# Patient Record
Sex: MALE | Race: WHITE | Employment: PART TIME | URBAN - METROPOLITAN AREA
[De-identification: names, ages, dates, MRNs, and addresses within clinical notes are randomized per-mention and may not be internally consistent; named-entity substitution may affect disease eponyms.]

---

## 2023-06-23 ENCOUNTER — OFFICE VISIT (OUTPATIENT)
Age: 18
End: 2023-06-23

## 2023-06-23 VITALS
DIASTOLIC BLOOD PRESSURE: 74 MMHG | WEIGHT: 134 LBS | BODY MASS INDEX: 24.66 KG/M2 | TEMPERATURE: 98 F | HEART RATE: 71 BPM | OXYGEN SATURATION: 98 % | HEIGHT: 62 IN | RESPIRATION RATE: 16 BRPM | SYSTOLIC BLOOD PRESSURE: 126 MMHG

## 2023-06-23 DIAGNOSIS — R42 VERTIGO: Primary | ICD-10-CM

## 2023-06-23 RX ORDER — MECLIZINE HYDROCHLORIDE 25 MG/1
25 TABLET ORAL 3 TIMES DAILY PRN
Qty: 15 TABLET | Refills: 0 | Status: SHIPPED | OUTPATIENT
Start: 2023-06-23

## 2023-06-23 ASSESSMENT — ENCOUNTER SYMPTOMS
NAUSEA: 0
ABDOMINAL PAIN: 0
VOMITING: 0

## 2023-06-23 NOTE — PROGRESS NOTES
Chief Complaint   Patient presents with    New Patient    Dizziness     Pt here today with complaint of lightheadedness since yesterday. States symptoms got worse through the day yesterday, woke up feeling better today but symptoms have returned. Denies spinning dizziness, denies chest pain or shortness of breath. Reports recently found insect bite to left thigh. Dizziness  Severity:  Mild  Onset quality:  Sudden  Duration:  1 day  Timing:  Intermittent  Progression:  Unchanged  Chronicity:  New  Context:  Feels light headed with head movement and position change  Relieved by:  Staying still  Ineffective treatments:  None  Associated symptoms: no abdominal pain, no chest pain, no congestion, no ear pain, no fatigue, no fever, no loss of consciousness, no myalgias, no nausea and no vomiting    Associated symptoms comment:  No loss of balance     History reviewed. No pertinent past medical history. History reviewed. No pertinent surgical history. Social History     Tobacco Use    Smoking status: Never    Smokeless tobacco: Never        No Known Allergies     Review of Systems   Constitutional:  Negative for fatigue and fever. HENT:  Negative for congestion and ear pain. Cardiovascular:  Negative for chest pain. Gastrointestinal:  Negative for abdominal pain, nausea and vomiting. Musculoskeletal:  Negative for myalgias. Neurological:  Positive for dizziness and light-headedness. Negative for loss of consciousness. All other systems reviewed and are negative. /74   Pulse 71   Temp 98 °F (36.7 °C)   Resp 16   Ht 5' 2\" (1.575 m)   Wt 134 lb (60.8 kg)   SpO2 98%   BMI 24.51 kg/m²      Physical Exam  Vitals and nursing note reviewed. Constitutional:       General: He is not in acute distress. Appearance: Normal appearance. HENT:      Right Ear: Tympanic membrane normal. There is no impacted cerumen. Left Ear: Tympanic membrane normal. There is no impacted cerumen.

## 2023-06-28 ENCOUNTER — APPOINTMENT (OUTPATIENT)
Facility: HOSPITAL | Age: 18
End: 2023-06-28
Payer: COMMERCIAL

## 2023-06-28 ENCOUNTER — HOSPITAL ENCOUNTER (EMERGENCY)
Facility: HOSPITAL | Age: 18
Discharge: HOME | End: 2023-06-28
Attending: STUDENT IN AN ORGANIZED HEALTH CARE EDUCATION/TRAINING PROGRAM
Payer: COMMERCIAL

## 2023-06-28 VITALS
HEIGHT: 63 IN | BODY MASS INDEX: 23.87 KG/M2 | RESPIRATION RATE: 18 BRPM | HEART RATE: 103 BPM | WEIGHT: 134.7 LBS | SYSTOLIC BLOOD PRESSURE: 128 MMHG | DIASTOLIC BLOOD PRESSURE: 82 MMHG | OXYGEN SATURATION: 99 % | TEMPERATURE: 97.3 F

## 2023-06-28 DIAGNOSIS — H53.8 BLURRY VISION, BILATERAL: Primary | ICD-10-CM

## 2023-06-28 DIAGNOSIS — R42 LIGHTHEADEDNESS: ICD-10-CM

## 2023-06-28 LAB
ALBUMIN SERPL-MCNC: 4.1 G/DL (ref 3.5–5)
ALBUMIN/GLOB SERPL: 1.1 (ref 1.1–2.2)
ALP SERPL-CCNC: 109 U/L (ref 45–117)
ALT SERPL-CCNC: 61 U/L (ref 12–78)
ANION GAP SERPL CALC-SCNC: 7 MMOL/L (ref 5–15)
AST SERPL-CCNC: 28 U/L (ref 15–37)
BASOPHILS # BLD: 0 K/UL (ref 0–0.1)
BASOPHILS NFR BLD: 1 % (ref 0–1)
BILIRUB SERPL-MCNC: 1.4 MG/DL (ref 0.2–1)
BUN SERPL-MCNC: 15 MG/DL (ref 6–20)
BUN/CREAT SERPL: 18 (ref 12–20)
CALCIUM SERPL-MCNC: 9.2 MG/DL (ref 8.5–10.1)
CHLORIDE SERPL-SCNC: 110 MMOL/L (ref 97–108)
CO2 SERPL-SCNC: 22 MMOL/L (ref 21–32)
CREAT SERPL-MCNC: 0.84 MG/DL (ref 0.7–1.3)
DIFFERENTIAL METHOD BLD: ABNORMAL
EOSINOPHIL # BLD: 0.1 K/UL (ref 0–0.4)
EOSINOPHIL NFR BLD: 2 % (ref 0–7)
ERYTHROCYTE [DISTWIDTH] IN BLOOD BY AUTOMATED COUNT: 12.8 % (ref 11.5–14.5)
GLOBULIN SER CALC-MCNC: 3.9 G/DL (ref 2–4)
GLUCOSE SERPL-MCNC: 116 MG/DL (ref 65–100)
HCT VFR BLD AUTO: 45.1 % (ref 36.6–50.3)
HGB BLD-MCNC: 15.6 G/DL (ref 12.1–17)
IMM GRANULOCYTES # BLD AUTO: 0 K/UL (ref 0–0.04)
IMM GRANULOCYTES NFR BLD AUTO: 1 % (ref 0–0.5)
LYMPHOCYTES # BLD: 1.2 K/UL (ref 0.8–3.5)
LYMPHOCYTES NFR BLD: 19 % (ref 12–49)
MAGNESIUM SERPL-MCNC: 2.3 MG/DL (ref 1.6–2.4)
MCH RBC QN AUTO: 30.2 PG (ref 26–34)
MCHC RBC AUTO-ENTMCNC: 34.6 G/DL (ref 30–36.5)
MCV RBC AUTO: 87.2 FL (ref 80–99)
MONOCYTES # BLD: 0.9 K/UL (ref 0–1)
MONOCYTES NFR BLD: 14 % (ref 5–13)
NEUTS SEG # BLD: 4.3 K/UL (ref 1.8–8)
NEUTS SEG NFR BLD: 63 % (ref 32–75)
NRBC # BLD: 0 K/UL (ref 0–0.01)
NRBC BLD-RTO: 0 PER 100 WBC
PLATELET # BLD AUTO: 225 K/UL (ref 150–400)
PMV BLD AUTO: 9.5 FL (ref 8.9–12.9)
POTASSIUM SERPL-SCNC: 3.2 MMOL/L (ref 3.5–5.1)
PROT SERPL-MCNC: 8 G/DL (ref 6.4–8.2)
RBC # BLD AUTO: 5.17 M/UL (ref 4.1–5.7)
SODIUM SERPL-SCNC: 139 MMOL/L (ref 136–145)
WBC # BLD AUTO: 6.6 K/UL (ref 4.1–11.1)

## 2023-06-28 PROCEDURE — 83735 ASSAY OF MAGNESIUM: CPT

## 2023-06-28 PROCEDURE — 80053 COMPREHEN METABOLIC PANEL: CPT

## 2023-06-28 PROCEDURE — 93005 ELECTROCARDIOGRAM TRACING: CPT | Performed by: STUDENT IN AN ORGANIZED HEALTH CARE EDUCATION/TRAINING PROGRAM

## 2023-06-28 PROCEDURE — 85025 COMPLETE CBC W/AUTO DIFF WBC: CPT

## 2023-06-28 PROCEDURE — 6370000000 HC RX 637 (ALT 250 FOR IP): Performed by: STUDENT IN AN ORGANIZED HEALTH CARE EDUCATION/TRAINING PROGRAM

## 2023-06-28 PROCEDURE — 99284 EMERGENCY DEPT VISIT MOD MDM: CPT

## 2023-06-28 PROCEDURE — 36415 COLL VENOUS BLD VENIPUNCTURE: CPT

## 2023-06-28 PROCEDURE — 70450 CT HEAD/BRAIN W/O DYE: CPT

## 2023-06-28 RX ORDER — BUTALBITAL/ACETAMINOPHEN 50MG-325MG
1 TABLET ORAL EVERY 4 HOURS PRN
Qty: 120 TABLET | Refills: 0 | Status: SHIPPED | OUTPATIENT
Start: 2023-06-28

## 2023-06-28 RX ORDER — BUTALBITAL, ACETAMINOPHEN AND CAFFEINE 50; 325; 40 MG/1; MG/1; MG/1
1 TABLET ORAL
Status: COMPLETED | OUTPATIENT
Start: 2023-06-28 | End: 2023-06-28

## 2023-06-28 RX ORDER — POTASSIUM CHLORIDE 20 MEQ/1
20 TABLET, EXTENDED RELEASE ORAL ONCE
Status: DISCONTINUED | OUTPATIENT
Start: 2023-06-28 | End: 2023-06-28 | Stop reason: HOSPADM

## 2023-06-28 RX ADMIN — BUTALBITAL, ACETAMINOPHEN, AND CAFFEINE 1 TABLET: 50; 325; 40 TABLET ORAL at 14:10

## 2023-06-28 ASSESSMENT — PAIN SCALES - GENERAL
PAINLEVEL_OUTOF10: 1
PAINLEVEL_OUTOF10: 0

## 2023-06-29 LAB
EKG ATRIAL RATE: 90 BPM
EKG DIAGNOSIS: NORMAL
EKG P AXIS: 84 DEGREES
EKG P-R INTERVAL: 138 MS
EKG Q-T INTERVAL: 348 MS
EKG QRS DURATION: 94 MS
EKG QTC CALCULATION (BAZETT): 425 MS
EKG R AXIS: 86 DEGREES
EKG T AXIS: 44 DEGREES
EKG VENTRICULAR RATE: 90 BPM

## 2023-10-30 ENCOUNTER — OFFICE VISIT (OUTPATIENT)
Age: 18
End: 2023-10-30
Payer: COMMERCIAL

## 2023-10-30 VITALS
OXYGEN SATURATION: 98 % | HEART RATE: 88 BPM | DIASTOLIC BLOOD PRESSURE: 71 MMHG | SYSTOLIC BLOOD PRESSURE: 125 MMHG | BODY MASS INDEX: 23.92 KG/M2 | RESPIRATION RATE: 18 BRPM | WEIGHT: 135 LBS | HEIGHT: 63 IN

## 2023-10-30 DIAGNOSIS — G43.009 MIGRAINE WITHOUT AURA AND WITHOUT STATUS MIGRAINOSUS, NOT INTRACTABLE: ICD-10-CM

## 2023-10-30 DIAGNOSIS — R42 DIZZINESS: Primary | ICD-10-CM

## 2023-10-30 DIAGNOSIS — H53.9 VISUAL CHANGES: ICD-10-CM

## 2023-10-30 PROCEDURE — 99205 OFFICE O/P NEW HI 60 MIN: CPT | Performed by: NURSE PRACTITIONER

## 2023-10-30 RX ORDER — RIZATRIPTAN BENZOATE 10 MG/1
TABLET, ORALLY DISINTEGRATING ORAL
Qty: 9 TABLET | Refills: 4 | Status: SHIPPED | OUTPATIENT
Start: 2023-10-30

## 2023-10-30 RX ORDER — AMITRIPTYLINE HYDROCHLORIDE 10 MG/1
10 TABLET, FILM COATED ORAL NIGHTLY
Qty: 90 TABLET | Refills: 1 | Status: SHIPPED | OUTPATIENT
Start: 2023-10-30

## 2023-10-30 ASSESSMENT — PATIENT HEALTH QUESTIONNAIRE - PHQ9
2. FEELING DOWN, DEPRESSED OR HOPELESS: 0
SUM OF ALL RESPONSES TO PHQ9 QUESTIONS 1 & 2: 0
1. LITTLE INTEREST OR PLEASURE IN DOING THINGS: 0
SUM OF ALL RESPONSES TO PHQ QUESTIONS 1-9: 0

## 2023-10-31 NOTE — PROGRESS NOTES
Tom Hopkins is a 19 y.o. male who presents with the following  Chief Complaint   Patient presents with    New Patient     Patient is here for headaches and blurry vision. It's been going on for about 4-5 months. Patient saw Dr. Lamont Juarez for the vision,he thought it was a migraine       HPI      New patient comes in with headaches and blurry vision with his father  They have been going on for about 4 to 5 weeks  They saw an eye doctor which was told them that it was ocular migraines  He cannot really describe how he feels formally but states sometimes he gets headaches and sometimes is just visual disturbances    He did have a CT which was normal  No MRI of the brain now    He states that his vision will come out of go out and be odd looking  May be like flashes or kaleidoscope  This happens a couple times a month but never escalates into a full-blown headache  He does get other types of headaches which are unilateral and debilitating  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 Max 2 in 24 hours 9 tablet 4    acetaminophen-butalbital  MG TABS Take 1 tablet by mouth every 4 hours as needed for Headaches 120 tablet 0    meclizine (ANTIVERT) 25 MG tablet Take 1 tablet by mouth 3 times daily as needed for Dizziness 15 tablet 0     No current facility-administered medications for this visit.         Social History     Tobacco Use   Smoking Status Never   Smokeless Tobacco Never

## 2023-11-19 ENCOUNTER — HOSPITAL ENCOUNTER (OUTPATIENT)
Facility: HOSPITAL | Age: 18
Discharge: HOME | End: 2023-11-22
Payer: COMMERCIAL

## 2023-11-19 DIAGNOSIS — H53.9 VISUAL CHANGES: ICD-10-CM

## 2023-11-19 DIAGNOSIS — G43.009 MIGRAINE WITHOUT AURA AND WITHOUT STATUS MIGRAINOSUS, NOT INTRACTABLE: ICD-10-CM

## 2023-11-19 DIAGNOSIS — R42 DIZZINESS: ICD-10-CM

## 2023-11-19 PROCEDURE — 6360000004 HC RX CONTRAST MEDICATION: Performed by: NURSE PRACTITIONER

## 2023-11-19 PROCEDURE — 70553 MRI BRAIN STEM W/O & W/DYE: CPT

## 2023-11-19 PROCEDURE — A9579 GAD-BASE MR CONTRAST NOS,1ML: HCPCS | Performed by: NURSE PRACTITIONER

## 2023-11-19 RX ADMIN — GADOTERIDOL 12 ML: 279.3 INJECTION, SOLUTION INTRAVENOUS at 11:38

## 2023-12-14 ENCOUNTER — OFFICE VISIT (OUTPATIENT)
Age: 18
End: 2023-12-14

## 2023-12-14 VITALS
WEIGHT: 141 LBS | DIASTOLIC BLOOD PRESSURE: 76 MMHG | TEMPERATURE: 97.9 F | OXYGEN SATURATION: 97 % | SYSTOLIC BLOOD PRESSURE: 121 MMHG | BODY MASS INDEX: 24.98 KG/M2 | RESPIRATION RATE: 20 BRPM | HEART RATE: 66 BPM

## 2023-12-14 DIAGNOSIS — J02.9 PHARYNGITIS, UNSPECIFIED ETIOLOGY: Primary | ICD-10-CM

## 2023-12-14 DIAGNOSIS — R11.0 NAUSEA: ICD-10-CM

## 2023-12-14 LAB
Lab: NORMAL
QC PASS/FAIL: NORMAL
SARS-COV-2 RDRP RESP QL NAA+PROBE: NEGATIVE
STREP PYOGENES DNA, POC: NEGATIVE
VALID INTERNAL CONTROL, POC: NORMAL

## 2023-12-14 RX ORDER — AMOXICILLIN 500 MG/1
500 CAPSULE ORAL 3 TIMES DAILY
Qty: 21 CAPSULE | Refills: 0 | Status: SHIPPED | OUTPATIENT
Start: 2023-12-14 | End: 2023-12-21

## 2023-12-14 RX ORDER — ONDANSETRON 4 MG/1
4 TABLET, ORALLY DISINTEGRATING ORAL 3 TIMES DAILY PRN
Qty: 12 TABLET | Refills: 0 | Status: SHIPPED | OUTPATIENT
Start: 2023-12-14

## 2023-12-15 NOTE — PATIENT INSTRUCTIONS
Dayquil/Nyquil as directed  Light diet- use maaloz for pain   Zyrtec/ Allegra daily   Saline sinus rinse

## 2024-01-15 ENCOUNTER — OFFICE VISIT (OUTPATIENT)
Age: 19
End: 2024-01-15

## 2024-01-15 VITALS
RESPIRATION RATE: 18 BRPM | TEMPERATURE: 98.9 F | DIASTOLIC BLOOD PRESSURE: 73 MMHG | HEART RATE: 67 BPM | BODY MASS INDEX: 25.33 KG/M2 | OXYGEN SATURATION: 98 % | WEIGHT: 143 LBS | SYSTOLIC BLOOD PRESSURE: 122 MMHG

## 2024-01-15 DIAGNOSIS — G43.009 MIGRAINE WITHOUT AURA AND WITHOUT STATUS MIGRAINOSUS, NOT INTRACTABLE: Primary | ICD-10-CM

## 2024-01-15 DIAGNOSIS — H53.9 VISUAL CHANGES: ICD-10-CM

## 2024-01-15 NOTE — PROGRESS NOTES
Colin Elias (:  2005) is a 18 y.o. male,Established patient, here for evaluation of the following chief complaint(s):  Headache (C/o headache. Sx for 1 year.)      ASSESSMENT/PLAN:  Visit Diagnoses and Associated Orders       Migraine without aura and without status migrainosus, not intractable    -  Primary         Visual changes                      Recommend headache diary.  May try tylenol, ibuprofen or excedrin migraine prn visual disturbance or headache.      Follow up with Neuro in PRN days if symptoms persist or ED if symptoms worsen.    SUBJECTIVE/OBJECTIVE:    Headache       18 y.o. male presents with mother with symptoms of headaches and visual disturbances for several months.  He reports visual disturbances 2-3 times a month.  States he was diagnosed with ocular migraine by ER physician and eye doctor Summer '23.  Today he was incapacitated by visual disturbance and had to leave work.  States vision goes in and out/blurry.  Vision improved after going home and resting.  He denies any visual disturbance or headache at this time.    Visual disturbances are sometimes accompanied by headaches but this is rare.  Mostly is having visual disturbances rather than headaches.  Had normal head CT in 2023 and MRI in  which was also normal.  Saw Neurology in late October and will follow up in April.  Taking amitriptyline nightly.  He has tried fioricet in past without relief.  Rizatriptan does seem to help.           Vitals:    01/15/24 1758   BP: 122/73   Site: Right Upper Arm   Position: Sitting   Cuff Size: Medium Adult   Pulse: 67   Resp: 18   Temp: 98.9 °F (37.2 °C)   TempSrc: Temporal   SpO2: 98%   Weight: 64.9 kg (143 lb)       No results found for this visit on 01/15/24.     Physical Exam  Constitutional:       General: He is not in acute distress.     Appearance: Normal appearance. He is not ill-appearing or toxic-appearing.   HENT:      Head: Normocephalic and atraumatic.

## 2024-01-25 ENCOUNTER — TELEPHONE (OUTPATIENT)
Age: 19
End: 2024-01-25

## 2024-01-25 NOTE — TELEPHONE ENCOUNTER
Patients mother called to say he has been experiencing migraines and he is asking for a medication to be prescribed. She states he has been having two a week. Thank you.

## 2024-04-04 ENCOUNTER — OFFICE VISIT (OUTPATIENT)
Age: 19
End: 2024-04-04
Payer: COMMERCIAL

## 2024-04-04 VITALS
DIASTOLIC BLOOD PRESSURE: 68 MMHG | OXYGEN SATURATION: 99 % | BODY MASS INDEX: 24.63 KG/M2 | WEIGHT: 139 LBS | HEART RATE: 82 BPM | SYSTOLIC BLOOD PRESSURE: 120 MMHG | RESPIRATION RATE: 15 BRPM | TEMPERATURE: 98.4 F | HEIGHT: 63 IN

## 2024-04-04 DIAGNOSIS — G43.101 MIGRAINE WITH AURA AND WITH STATUS MIGRAINOSUS, NOT INTRACTABLE: ICD-10-CM

## 2024-04-04 DIAGNOSIS — J32.4 CHRONIC PANSINUSITIS: ICD-10-CM

## 2024-04-04 DIAGNOSIS — G43.109 MIGRAINE AURA OCCURRING WITH AND WITHOUT HEADACHE: Primary | ICD-10-CM

## 2024-04-04 PROCEDURE — 99214 OFFICE O/P EST MOD 30 MIN: CPT | Performed by: PSYCHIATRY & NEUROLOGY

## 2024-04-04 RX ORDER — ASPIRIN 81 MG/1
81 TABLET ORAL DAILY
COMMUNITY

## 2024-04-04 ASSESSMENT — PATIENT HEALTH QUESTIONNAIRE - PHQ9
SUM OF ALL RESPONSES TO PHQ QUESTIONS 1-9: 0
1. LITTLE INTEREST OR PLEASURE IN DOING THINGS: NOT AT ALL
SUM OF ALL RESPONSES TO PHQ9 QUESTIONS 1 & 2: 0
SUM OF ALL RESPONSES TO PHQ QUESTIONS 1-9: 0
2. FEELING DOWN, DEPRESSED OR HOPELESS: NOT AT ALL

## 2024-04-04 NOTE — PROGRESS NOTES
Consult  REFERRED BY:  None, None    CHIEF COMPLAINT: Migraine auras      Subjective:     Colin Elias is a 18 y.o. right-handed  male we are seeing as a new patient, for evaluation of persistent ocular migraines and migraine auras at the request of RULA Oneill which have been going on for about a year, brought on by stress and tension at times, and sometimes bright lights, he will get some blurred vision and difficulty focusing that lasts a short time, but half an hour to an hour, and occasionally gets a headache for which she takes over-the-counter herbal medication which seems to help the headache so we will just continue doing that.  He was on Maxalt but does not take that anymore.  He takes 10 mg of amitriptyline at nighttime as preventative agents that seem to help some, and they have all of the reduced in number and severity.  He has flashing lights in his eyes sometimes with the blurred vision.  He has seen ophthalmology and they diagnosed him as ocular migraine.  He has had an MRI scan of the brain done was probably normal of the brain does show chronic pansinusitis, moderate severity, who recommended that he see an ENT physician, and so he will go see his mother's ENT physician for evaluation.  We discussed his condition with the patient and his father in detail.  Had a normal CT of the brain also in the past his MRI scan was just done in November 2023.  That was reviewed in detail with the patient and his father on the PACS system in the office today.  His examination is relatively normal though he is a little bit slow in his responses.  He does have Fioricet to use as needed for the headaches if he needs it.  He has no other neurologic complaints and seen be doing well now and says the medications of amitriptyline at nighttime has helped.    History reviewed. No pertinent past medical history.   History reviewed. No pertinent surgical history.  History reviewed. No pertinent family

## 2024-11-16 ENCOUNTER — HOSPITAL ENCOUNTER (EMERGENCY)
Facility: HOSPITAL | Age: 19
Discharge: HOME | End: 2024-11-16
Attending: EMERGENCY MEDICINE
Payer: COMMERCIAL

## 2024-11-16 VITALS
TEMPERATURE: 98.6 F | SYSTOLIC BLOOD PRESSURE: 111 MMHG | DIASTOLIC BLOOD PRESSURE: 72 MMHG | WEIGHT: 145.5 LBS | OXYGEN SATURATION: 97 % | HEART RATE: 121 BPM | BODY MASS INDEX: 25.77 KG/M2 | RESPIRATION RATE: 22 BRPM

## 2024-11-16 DIAGNOSIS — J02.9 VIRAL PHARYNGITIS: Primary | ICD-10-CM

## 2024-11-16 LAB
FLUAV RNA SPEC QL NAA+PROBE: NOT DETECTED
FLUBV RNA SPEC QL NAA+PROBE: NOT DETECTED
S PYO DNA THROAT QL NAA+PROBE: NOT DETECTED
SARS-COV-2 RNA RESP QL NAA+PROBE: NOT DETECTED
SOURCE: NORMAL

## 2024-11-16 PROCEDURE — 6370000000 HC RX 637 (ALT 250 FOR IP): Performed by: EMERGENCY MEDICINE

## 2024-11-16 PROCEDURE — 87636 SARSCOV2 & INF A&B AMP PRB: CPT

## 2024-11-16 PROCEDURE — 87651 STREP A DNA AMP PROBE: CPT

## 2024-11-16 PROCEDURE — 99283 EMERGENCY DEPT VISIT LOW MDM: CPT

## 2024-11-16 RX ORDER — ONDANSETRON 4 MG/1
8 TABLET, ORALLY DISINTEGRATING ORAL ONCE
Status: COMPLETED | OUTPATIENT
Start: 2024-11-16 | End: 2024-11-16

## 2024-11-16 RX ORDER — ONDANSETRON 8 MG/1
8 TABLET, ORALLY DISINTEGRATING ORAL EVERY 8 HOURS PRN
Qty: 15 TABLET | Refills: 0 | Status: SHIPPED | OUTPATIENT
Start: 2024-11-16

## 2024-11-16 RX ORDER — BUTALBITAL, ACETAMINOPHEN AND CAFFEINE 50; 325; 40 MG/1; MG/1; MG/1
1 TABLET ORAL
Status: COMPLETED | OUTPATIENT
Start: 2024-11-16 | End: 2024-11-16

## 2024-11-16 RX ORDER — IBUPROFEN 600 MG/1
600 TABLET, FILM COATED ORAL ONCE
Status: COMPLETED | OUTPATIENT
Start: 2024-11-16 | End: 2024-11-16

## 2024-11-16 RX ORDER — IBUPROFEN 600 MG/1
600 TABLET, FILM COATED ORAL 3 TIMES DAILY PRN
Qty: 28 TABLET | Refills: 0 | Status: SHIPPED | OUTPATIENT
Start: 2024-11-16

## 2024-11-16 RX ADMIN — BUTALBITAL, ACETAMINOPHEN, AND CAFFEINE 1 TABLET: 50; 325; 40 TABLET ORAL at 22:43

## 2024-11-16 RX ADMIN — ONDANSETRON 8 MG: 4 TABLET, ORALLY DISINTEGRATING ORAL at 22:42

## 2024-11-16 RX ADMIN — IBUPROFEN 600 MG: 600 TABLET, FILM COATED ORAL at 22:42

## 2024-11-16 ASSESSMENT — PAIN - FUNCTIONAL ASSESSMENT: PAIN_FUNCTIONAL_ASSESSMENT: NONE - DENIES PAIN

## 2024-11-16 ASSESSMENT — PAIN SCALES - GENERAL
PAINLEVEL_OUTOF10: 2
PAINLEVEL_OUTOF10: 2

## 2024-11-16 ASSESSMENT — LIFESTYLE VARIABLES
HOW OFTEN DO YOU HAVE A DRINK CONTAINING ALCOHOL: 2-4 TIMES A MONTH
HOW MANY STANDARD DRINKS CONTAINING ALCOHOL DO YOU HAVE ON A TYPICAL DAY: 1 OR 2

## 2024-11-16 ASSESSMENT — PAIN DESCRIPTION - LOCATION: LOCATION: THROAT

## 2024-11-17 NOTE — ED NOTES
Bedside and Verbal shift change report given to RAMESH Solis (oncoming nurse) by RAMESH Macdonald (offgoing nurse). Report included the following information Nurse Handoff Report, ED Encounter Summary, and ED SBAR.

## 2024-11-17 NOTE — ED PROVIDER NOTES
[facility] EMERGENCY DEPT  EMERGENCY DEPARTMENT ENCOUNTER       Pt Name: Colin Elias  MRN: [id number]  Birthdate 2005  Date of evaluation: 11/16/2024  Provider: Subhash Palma DO   PCP: None, None  Note Started: 10:30 PM EST 11/16/24     CHIEF COMPLAINT       Chief Complaint   Patient presents with    Pharyngitis     Pt ambulatory to triage c/o of sore throat since this morning, is now nauseous. Denies OTC meds PTA.         HISTORY OF PRESENT ILLNESS: 1 or more elements      History From: Patient, History limited by: none     Colin Elias is a 19 y.o. male past medical history significant for migraine presenting the emergency department complaining of sore throat, nausea, vomiting.  Symptoms started today       Please See MDM for Additional Details of the HPI/PMH  Nursing Notes were all reviewed and agreed with or any disagreements were addressed in the HPI.     REVIEW OF SYSTEMS        Positives and Pertinent negatives as per HPI.    PAST HISTORY     Past Medical History:  No past medical history on file.    Past Surgical History:  No past surgical history on file.    Family History:  No family history on file.    Social History:  Social History     Tobacco Use    Smoking status: Never     Passive exposure: Never    Smokeless tobacco: Never   Substance Use Topics    Alcohol use: Not Currently    Drug use: Never       Allergies:  Allergies   Allergen Reactions    Seasonal        CURRENT MEDICATIONS      Discharge Medication List as of 11/16/2024 11:44 PM        CONTINUE these medications which have NOT CHANGED    Details   !! NONFORMULARY Migrastil Migraine StickHistorical Med      !! NONFORMULARY [place] Homeopathic Migraine ReliefHistorical Med      aspirin 81 MG EC tablet Take 1 tablet by mouth dailyHistorical Med      !! ondansetron (ZOFRAN-ODT) 4 MG disintegrating tablet Take 1 tablet by mouth 3 times daily as needed for Nausea or Vomiting, Disp-12 tablet, R-0Normal      amitriptyline (ELAVIL) 10 MG tablet

## 2025-06-05 ENCOUNTER — APPOINTMENT (OUTPATIENT)
Facility: HOSPITAL | Age: 20
End: 2025-06-05
Payer: COMMERCIAL

## 2025-06-05 ENCOUNTER — HOSPITAL ENCOUNTER (EMERGENCY)
Facility: HOSPITAL | Age: 20
Discharge: HOME | End: 2025-06-05
Attending: EMERGENCY MEDICINE
Payer: COMMERCIAL

## 2025-06-05 VITALS
WEIGHT: 140.65 LBS | HEART RATE: 55 BPM | TEMPERATURE: 98.2 F | BODY MASS INDEX: 24.92 KG/M2 | HEIGHT: 63 IN | DIASTOLIC BLOOD PRESSURE: 65 MMHG | SYSTOLIC BLOOD PRESSURE: 136 MMHG | OXYGEN SATURATION: 97 % | RESPIRATION RATE: 20 BRPM

## 2025-06-05 DIAGNOSIS — R07.89 CHEST WALL PAIN: Primary | ICD-10-CM

## 2025-06-05 DIAGNOSIS — R06.00 DYSPNEA, UNSPECIFIED TYPE: ICD-10-CM

## 2025-06-05 LAB
EKG ATRIAL RATE: 60 BPM
EKG DIAGNOSIS: NORMAL
EKG P AXIS: 52 DEGREES
EKG P-R INTERVAL: 154 MS
EKG Q-T INTERVAL: 404 MS
EKG QRS DURATION: 104 MS
EKG QTC CALCULATION (BAZETT): 404 MS
EKG R AXIS: 64 DEGREES
EKG T AXIS: 40 DEGREES
EKG VENTRICULAR RATE: 60 BPM

## 2025-06-05 PROCEDURE — 99284 EMERGENCY DEPT VISIT MOD MDM: CPT

## 2025-06-05 PROCEDURE — 71046 X-RAY EXAM CHEST 2 VIEWS: CPT

## 2025-06-05 PROCEDURE — 6370000000 HC RX 637 (ALT 250 FOR IP): Performed by: EMERGENCY MEDICINE

## 2025-06-05 PROCEDURE — 93005 ELECTROCARDIOGRAM TRACING: CPT | Performed by: EMERGENCY MEDICINE

## 2025-06-05 RX ORDER — IBUPROFEN 600 MG/1
600 TABLET, FILM COATED ORAL 3 TIMES DAILY PRN
Qty: 12 TABLET | Refills: 0 | Status: SHIPPED | OUTPATIENT
Start: 2025-06-05

## 2025-06-05 RX ORDER — IBUPROFEN 400 MG/1
400 TABLET, FILM COATED ORAL
Status: COMPLETED | OUTPATIENT
Start: 2025-06-05 | End: 2025-06-05

## 2025-06-05 RX ORDER — METHOCARBAMOL 750 MG/1
750 TABLET, FILM COATED ORAL 4 TIMES DAILY
Qty: 12 TABLET | Refills: 0 | Status: SHIPPED | OUTPATIENT
Start: 2025-06-05 | End: 2025-06-15

## 2025-06-05 RX ADMIN — IBUPROFEN 400 MG: 400 TABLET, FILM COATED ORAL at 20:48

## 2025-06-05 ASSESSMENT — LIFESTYLE VARIABLES
HOW MANY STANDARD DRINKS CONTAINING ALCOHOL DO YOU HAVE ON A TYPICAL DAY: PATIENT DOES NOT DRINK
HOW OFTEN DO YOU HAVE A DRINK CONTAINING ALCOHOL: NEVER

## 2025-06-05 ASSESSMENT — PAIN DESCRIPTION - LOCATION: LOCATION: CHEST

## 2025-06-05 ASSESSMENT — PAIN DESCRIPTION - ORIENTATION: ORIENTATION: MID

## 2025-06-05 ASSESSMENT — PAIN SCALES - GENERAL
PAINLEVEL_OUTOF10: 0
PAINLEVEL_OUTOF10: 6
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
